# Patient Record
Sex: FEMALE | Race: BLACK OR AFRICAN AMERICAN | NOT HISPANIC OR LATINO | URBAN - METROPOLITAN AREA
[De-identification: names, ages, dates, MRNs, and addresses within clinical notes are randomized per-mention and may not be internally consistent; named-entity substitution may affect disease eponyms.]

---

## 2024-03-11 ENCOUNTER — OV EP (OUTPATIENT)
Dept: URBAN - METROPOLITAN AREA CLINIC 113 | Facility: CLINIC | Age: 29
End: 2024-03-11
Payer: MEDICAID

## 2024-03-11 VITALS
BODY MASS INDEX: 35.09 KG/M2 | TEMPERATURE: 97.5 F | HEART RATE: 98 BPM | WEIGHT: 156 LBS | SYSTOLIC BLOOD PRESSURE: 100 MMHG | HEIGHT: 56 IN | DIASTOLIC BLOOD PRESSURE: 73 MMHG | RESPIRATION RATE: 14 BRPM

## 2024-03-11 DIAGNOSIS — K59.04 CHRONIC IDIOPATHIC CONSTIPATION: ICD-10-CM

## 2024-03-11 DIAGNOSIS — R14.0 ABDOMINAL BLOATING: ICD-10-CM

## 2024-03-11 PROBLEM — 82934008: Status: ACTIVE | Noted: 2024-03-11

## 2024-03-11 PROCEDURE — 99214 OFFICE O/P EST MOD 30 MIN: CPT

## 2024-03-11 RX ORDER — AZITHROMYCIN DIHYDRATE 250 MG/1
TABLET, FILM COATED ORAL
Qty: 6 | Refills: 0 | Status: ON HOLD | COMMUNITY
Start: 2019-01-12

## 2024-03-11 RX ORDER — VENLAFAXINE 37.5 MG/1
TABLET, EXTENDED RELEASE ORAL
Qty: 30 | Refills: 0 | Status: ON HOLD | COMMUNITY
Start: 2018-12-11

## 2024-03-11 RX ORDER — LINACLOTIDE 72 UG/1
1 CAPSULE AT LEAST 30 MINUTES BEFORE THE FIRST MEAL OF THE DAY ON AN EMPTY STOMACH CAPSULE, GELATIN COATED ORAL ONCE A DAY
Qty: 90 | Refills: 3 | OUTPATIENT
Start: 2024-03-11 | End: 2025-03-05

## 2024-03-11 RX ORDER — INSULIN GLARGINE 300 U/ML
INJECT 32 UNIT DAILY INJECTION, SOLUTION SUBCUTANEOUS
Refills: 0 | Status: ACTIVE | COMMUNITY

## 2024-03-11 RX ORDER — EMPAGLIFLOZIN 25 MG/1
TABLET, FILM COATED ORAL
Qty: 30 | Refills: 0 | Status: ACTIVE | COMMUNITY
Start: 2018-09-10

## 2024-03-11 RX ORDER — EXENATIDE 2 MG/.65ML
INJECTION, SUSPENSION, EXTENDED RELEASE SUBCUTANEOUS
Qty: 4 | Refills: 0 | Status: ON HOLD | COMMUNITY
Start: 2018-05-11

## 2024-03-11 RX ORDER — OMEPRAZOLE 20 MG/1
TAKE 1 CAPSULE BY MOUTH DAILY CAPSULE, DELAYED RELEASE ORAL
Qty: 90 | Refills: 2 | Status: ON HOLD | COMMUNITY
Start: 2019-02-12

## 2024-03-11 RX ORDER — LINACLOTIDE 72 UG/1
TAKE 1 CAPSULE DAILY CAPSULE, GELATIN COATED ORAL
Qty: 90 | Refills: 3 | Status: ON HOLD | COMMUNITY
Start: 2019-02-12

## 2024-03-11 RX ORDER — BLOOD SUGAR DIAGNOSTIC
STRIP MISCELLANEOUS
Qty: 100 | Refills: 0 | Status: ON HOLD | COMMUNITY
Start: 2018-06-13

## 2024-03-11 RX ORDER — SITAGLIPTIN AND METFORMIN HYDROCHLORIDE 100; 1000 MG/1; MG/1
TABLET, FILM COATED, EXTENDED RELEASE ORAL
Qty: 30 | Refills: 0 | Status: ON HOLD | COMMUNITY
Start: 2018-03-12

## 2024-03-11 RX ORDER — BLOOD-GLUCOSE METER
EACH MISCELLANEOUS
Qty: 1 | Refills: 0 | Status: ON HOLD | COMMUNITY
Start: 2018-06-13

## 2024-03-11 RX ORDER — SITAGLIPTIN AND METFORMIN HYDROCHLORIDE 50; 1000 MG/1; MG/1
TAKE 1 TABLET DAILY WITH FOOD TABLET, FILM COATED ORAL
Refills: 0 | Status: ON HOLD | COMMUNITY

## 2024-03-11 RX ORDER — METFORMIN HYDROCHLORIDE 500 MG/1
TABLET, COATED ORAL
Qty: 30 | Refills: 0 | Status: ON HOLD | COMMUNITY
Start: 2018-06-20

## 2024-03-11 RX ORDER — ATORVASTATIN CALCIUM 80 MG
TAKE 1 TABLET DAILY TABLET ORAL
Refills: 0 | Status: ON HOLD | COMMUNITY

## 2024-03-11 RX ORDER — ROSUVASTATIN CALCIUM 40 MG/1
1 TABLET TABLET, COATED ORAL ONCE A DAY
Status: ACTIVE | COMMUNITY

## 2024-03-11 RX ORDER — INSULIN LISPRO 100 [IU]/ML
AS DIRECTED INJECTION, SOLUTION INTRAVENOUS; SUBCUTANEOUS
Status: ACTIVE | COMMUNITY

## 2024-03-11 RX ORDER — EXENATIDE 2 MG/.85ML
INJECT 32 UNITS DAILY INJECTION, SUSPENSION, EXTENDED RELEASE SUBCUTANEOUS
Refills: 0 | Status: ON HOLD | COMMUNITY
Start: 2018-12-10

## 2024-03-11 RX ORDER — ESCITALOPRAM 20 MG/1
TABLET, FILM COATED ORAL
Qty: 30 | Refills: 0 | Status: ON HOLD | COMMUNITY
Start: 2018-06-13

## 2024-03-11 RX ORDER — KETOCONAZOLE 20.5 MG/ML
SHAMPOO, SUSPENSION TOPICAL
Qty: 120 | Refills: 0 | Status: ON HOLD | COMMUNITY
Start: 2019-06-06

## 2024-03-11 RX ORDER — ALPRAZOLAM 0.25 MG/1
1 TABLET TABLET ORAL TWICE A DAY
Status: ACTIVE | COMMUNITY

## 2024-03-11 RX ORDER — TERBINAFINE HYDROCHLORIDE 250 MG/1
TK 1 T PO QD TABLET ORAL
Qty: 30 | Refills: 0 | Status: ON HOLD | COMMUNITY
Start: 2019-06-06

## 2024-03-11 NOTE — HPI-TODAY'S VISIT:
28-year-old female presents for follow-up.  She was last seen 10/5/2023.  We reviewed a low FODMAP diet and recommended align probiotic.  She would also increase her MiraLAX to daily use with addition of fiber to prevent loose stools.  If no improvement we would consider Flagyl.   She presents with her caregiver. Her mother had an appointment today and could not make it. She has yet to try the probiotic. She states Miralax daily does not help her consitpation. Her mother is incquiring about a prescription for constipation. She is also asking about "bitters" as a dietary aid for bloating.  10/5/2023:  28-year-old female with a history of Down syndrome, PDA repair as a child and diabetes mellitus presents for long interval follow-up.  She has been followed by Dr. Melvin since 2017 for constipation and heartburn.  She underwent EGD in 2018 for epigastric pain, heartburn and reflux symptoms.  This revealed normal esophagus, normal duodenum and gastritis.  Gastric biopsies revealed mild chronic gastritis, negative for H. pylori.  She was last seen in 2019 at which time she was provided samples of Linzess 72 mcg daily.  She has tried and failed MiraLAX in the past and Amitiza caused diarrhea.  She was to continue omeprazole 20 mg daily for reflux symptoms.  She has been lost to follow-up ever since. She was seen in the ED at [facility] on 7/28/2023 with complaints of abdominal pain and nausea.  Labs revealed unremarkable CBC, glucose 248, normal LFTs, normal lipase, moderate amount of blood in the urine, elevated white blood cells and bacteria in the urine.  CT scan of abdomen/pelvis revealed hyperemic tubular structures with extensive adjacent laboratory fat stranding involving the right greater than left adnexa consistent with pelvic inflammatory disease/salpingitis and concern for developing tubo-ovarian abscesses.  There is mild secondary inflammation of the cecum.  Pelvic ultrasound revealed unremarkable was unremarkable.  There is no suspicion for foul play.  She was started on oral antibiotics and was to follow-up. CT scan of the abdomen/pelvis with contrast 9/5/2023:Multiseptated fluid collection in the right adnexa most concerning for tubo-ovarian abscess with significant adjacent laboratory changes.  Multiple prominent mesenteric lymph nodes in the pelvis likely reactive.  Liver, gallbladder, pancreas, spleen are normal.  GI tract is normal. Gallbladder ultrasound on 9/5/2023:No acute findings. Pelvic ultrasound 9/5/2023: Visualized portion of the right ovary appears normal however large ovarian abscess seen on CT is likely more superiorly in position beyond the visualization of ultrasound.  Chest x-ray 9/5/2023: Chronic changes without definitive acute finding.  She presents with her mother who aids in providing a history as the patient is mostly non-verbal. She was having abdominal bloating a few months ago prior to presenting to the ED in July. The bloating had improved since then. The mother is not sure if the antibiotics given at the time helped and or whether the MiraLAX has been helping. She was noted to have constipation. She is taking MiraLAX every other day as daily use causes loose stools. She does consume a lot of dairy most days as well. Denies rectal bleeding. Denies abdominal pain. Her mother does not believe she has complained of reflux lately.   Regarding her GYN findings on CT, she was evaluated by her gynecologist who did not believe the CT findings to be clinically significant or concerning.

## 2024-05-13 ENCOUNTER — DASHBOARD ENCOUNTERS (OUTPATIENT)
Age: 29
End: 2024-05-13

## 2024-05-13 ENCOUNTER — OFFICE VISIT (OUTPATIENT)
Dept: URBAN - METROPOLITAN AREA CLINIC 113 | Facility: CLINIC | Age: 29
End: 2024-05-13
Payer: MEDICAID

## 2024-05-13 VITALS
TEMPERATURE: 97.5 F | BODY MASS INDEX: 36.53 KG/M2 | WEIGHT: 162.4 LBS | HEIGHT: 56 IN | SYSTOLIC BLOOD PRESSURE: 95 MMHG | HEART RATE: 97.2 BPM | DIASTOLIC BLOOD PRESSURE: 64 MMHG

## 2024-05-13 DIAGNOSIS — K59.04 CHRONIC IDIOPATHIC CONSTIPATION: ICD-10-CM

## 2024-05-13 DIAGNOSIS — R14.0 ABDOMINAL BLOATING: ICD-10-CM

## 2024-05-13 PROCEDURE — 99214 OFFICE O/P EST MOD 30 MIN: CPT

## 2024-05-13 RX ORDER — TERBINAFINE HYDROCHLORIDE 250 MG/1
TK 1 T PO QD TABLET ORAL
Qty: 30 | Refills: 0 | Status: ON HOLD | COMMUNITY
Start: 2019-06-06

## 2024-05-13 RX ORDER — INSULIN GLARGINE 300 U/ML
INJECT 32 UNIT DAILY INJECTION, SOLUTION SUBCUTANEOUS
Refills: 0 | Status: ACTIVE | COMMUNITY

## 2024-05-13 RX ORDER — AZITHROMYCIN DIHYDRATE 250 MG/1
TABLET, FILM COATED ORAL
Qty: 6 | Refills: 0 | Status: ON HOLD | COMMUNITY
Start: 2019-01-12

## 2024-05-13 RX ORDER — LINACLOTIDE 72 UG/1
1 CAPSULE AT LEAST 30 MINUTES BEFORE THE FIRST MEAL OF THE DAY ON AN EMPTY STOMACH CAPSULE, GELATIN COATED ORAL ONCE A DAY
Qty: 90 | Refills: 3 | Status: ACTIVE | COMMUNITY
Start: 2024-03-11 | End: 2025-03-05

## 2024-05-13 RX ORDER — METFORMIN HYDROCHLORIDE 500 MG/1
TABLET, COATED ORAL
Qty: 30 | Refills: 0 | Status: ON HOLD | COMMUNITY
Start: 2018-06-20

## 2024-05-13 RX ORDER — VENLAFAXINE 37.5 MG/1
TABLET, EXTENDED RELEASE ORAL
Qty: 30 | Refills: 0 | Status: ON HOLD | COMMUNITY
Start: 2018-12-11

## 2024-05-13 RX ORDER — BLOOD-GLUCOSE METER
EACH MISCELLANEOUS
Qty: 1 | Refills: 0 | Status: ON HOLD | COMMUNITY
Start: 2018-06-13

## 2024-05-13 RX ORDER — OMEPRAZOLE 20 MG/1
TAKE 1 CAPSULE BY MOUTH DAILY CAPSULE, DELAYED RELEASE ORAL
Qty: 90 | Refills: 2 | Status: ON HOLD | COMMUNITY
Start: 2019-02-12

## 2024-05-13 RX ORDER — SITAGLIPTIN AND METFORMIN HYDROCHLORIDE 50; 1000 MG/1; MG/1
TAKE 1 TABLET DAILY WITH FOOD TABLET, FILM COATED ORAL
Refills: 0 | Status: ON HOLD | COMMUNITY

## 2024-05-13 RX ORDER — FLUCONAZOLE 150 MG/1
1 TABLET TABLET ORAL
Qty: 2 | Refills: 0 | OUTPATIENT
Start: 2024-05-13 | End: 2024-05-15

## 2024-05-13 RX ORDER — LINACLOTIDE 72 UG/1
1 CAPSULE AT LEAST 30 MINUTES BEFORE THE FIRST MEAL OF THE DAY ON AN EMPTY STOMACH CAPSULE, GELATIN COATED ORAL ONCE A DAY
Qty: 90 | Refills: 3 | OUTPATIENT

## 2024-05-13 RX ORDER — METRONIDAZOLE 250 MG/1
1 TABLET TABLET ORAL THREE TIMES A DAY
Qty: 30 TABLET | Refills: 0 | OUTPATIENT
Start: 2024-05-13 | End: 2024-05-23

## 2024-05-13 RX ORDER — ATORVASTATIN CALCIUM 80 MG
TAKE 1 TABLET DAILY TABLET ORAL
Refills: 0 | Status: ON HOLD | COMMUNITY

## 2024-05-13 RX ORDER — SITAGLIPTIN AND METFORMIN HYDROCHLORIDE 100; 1000 MG/1; MG/1
TABLET, FILM COATED, EXTENDED RELEASE ORAL
Qty: 30 | Refills: 0 | Status: ON HOLD | COMMUNITY
Start: 2018-03-12

## 2024-05-13 RX ORDER — INSULIN LISPRO 100 [IU]/ML
AS DIRECTED INJECTION, SOLUTION INTRAVENOUS; SUBCUTANEOUS
Status: ACTIVE | COMMUNITY

## 2024-05-13 RX ORDER — BLOOD SUGAR DIAGNOSTIC
STRIP MISCELLANEOUS
Qty: 100 | Refills: 0 | Status: ON HOLD | COMMUNITY
Start: 2018-06-13

## 2024-05-13 RX ORDER — EXENATIDE 2 MG/.65ML
INJECTION, SUSPENSION, EXTENDED RELEASE SUBCUTANEOUS
Qty: 4 | Refills: 0 | Status: ON HOLD | COMMUNITY
Start: 2018-05-11

## 2024-05-13 RX ORDER — ALPRAZOLAM 0.25 MG/1
1 TABLET TABLET ORAL TWICE A DAY
Status: ACTIVE | COMMUNITY

## 2024-05-13 RX ORDER — ESCITALOPRAM 20 MG/1
TABLET, FILM COATED ORAL
Qty: 30 | Refills: 0 | Status: ON HOLD | COMMUNITY
Start: 2018-06-13

## 2024-05-13 RX ORDER — EMPAGLIFLOZIN 25 MG/1
TABLET, FILM COATED ORAL
Qty: 30 | Refills: 0 | Status: ON HOLD | COMMUNITY
Start: 2018-09-10

## 2024-05-13 RX ORDER — LINACLOTIDE 72 UG/1
TAKE 1 CAPSULE DAILY CAPSULE, GELATIN COATED ORAL
Qty: 90 | Refills: 3 | Status: ON HOLD | COMMUNITY
Start: 2019-02-12

## 2024-05-13 RX ORDER — EXENATIDE 2 MG/.85ML
INJECT 32 UNITS DAILY INJECTION, SUSPENSION, EXTENDED RELEASE SUBCUTANEOUS
Refills: 0 | Status: ON HOLD | COMMUNITY
Start: 2018-12-10

## 2024-05-13 RX ORDER — ROSUVASTATIN CALCIUM 40 MG/1
1 TABLET TABLET, COATED ORAL ONCE A DAY
Status: ACTIVE | COMMUNITY

## 2024-05-13 RX ORDER — KETOCONAZOLE 20.5 MG/ML
SHAMPOO, SUSPENSION TOPICAL
Qty: 120 | Refills: 0 | Status: ON HOLD | COMMUNITY
Start: 2019-06-06

## 2024-08-13 ENCOUNTER — OFFICE VISIT (OUTPATIENT)
Dept: URBAN - METROPOLITAN AREA CLINIC 113 | Facility: CLINIC | Age: 29
End: 2024-08-13

## 2024-08-13 RX ORDER — VENLAFAXINE 37.5 MG/1
TABLET, EXTENDED RELEASE ORAL
Qty: 30 | Refills: 0 | Status: ON HOLD | COMMUNITY
Start: 2018-12-11

## 2024-08-13 RX ORDER — SITAGLIPTIN AND METFORMIN HYDROCHLORIDE 100; 1000 MG/1; MG/1
TABLET, FILM COATED, EXTENDED RELEASE ORAL
Qty: 30 | Refills: 0 | Status: ON HOLD | COMMUNITY
Start: 2018-03-12

## 2024-08-13 RX ORDER — EXENATIDE 2 MG/.85ML
INJECT 32 UNITS DAILY INJECTION, SUSPENSION, EXTENDED RELEASE SUBCUTANEOUS
Refills: 0 | Status: ON HOLD | COMMUNITY
Start: 2018-12-10

## 2024-08-13 RX ORDER — METFORMIN HYDROCHLORIDE 500 MG/1
TABLET, COATED ORAL
Qty: 30 | Refills: 0 | Status: ON HOLD | COMMUNITY
Start: 2018-06-20

## 2024-08-13 RX ORDER — ATORVASTATIN CALCIUM 80 MG
TAKE 1 TABLET DAILY TABLET ORAL
Refills: 0 | Status: ON HOLD | COMMUNITY

## 2024-08-13 RX ORDER — LINACLOTIDE 72 UG/1
1 CAPSULE AT LEAST 30 MINUTES BEFORE THE FIRST MEAL OF THE DAY ON AN EMPTY STOMACH CAPSULE, GELATIN COATED ORAL ONCE A DAY
Qty: 90 | Refills: 3 | Status: ACTIVE | COMMUNITY

## 2024-08-13 RX ORDER — EMPAGLIFLOZIN 25 MG/1
TABLET, FILM COATED ORAL
Qty: 30 | Refills: 0 | Status: ON HOLD | COMMUNITY
Start: 2018-09-10

## 2024-08-13 RX ORDER — ESCITALOPRAM 20 MG/1
TABLET, FILM COATED ORAL
Qty: 30 | Refills: 0 | Status: ON HOLD | COMMUNITY
Start: 2018-06-13

## 2024-08-13 RX ORDER — ROSUVASTATIN CALCIUM 40 MG/1
1 TABLET TABLET, COATED ORAL ONCE A DAY
Status: ACTIVE | COMMUNITY

## 2024-08-13 RX ORDER — TERBINAFINE HYDROCHLORIDE 250 MG/1
TK 1 T PO QD TABLET ORAL
Qty: 30 | Refills: 0 | Status: ON HOLD | COMMUNITY
Start: 2019-06-06

## 2024-08-13 RX ORDER — INSULIN LISPRO 100 [IU]/ML
AS DIRECTED INJECTION, SOLUTION INTRAVENOUS; SUBCUTANEOUS
Status: ACTIVE | COMMUNITY

## 2024-08-13 RX ORDER — INSULIN GLARGINE 300 U/ML
INJECT 32 UNIT DAILY INJECTION, SOLUTION SUBCUTANEOUS
Refills: 0 | Status: ACTIVE | COMMUNITY

## 2024-08-13 RX ORDER — LINACLOTIDE 72 UG/1
TAKE 1 CAPSULE DAILY CAPSULE, GELATIN COATED ORAL
Qty: 90 | Refills: 3 | Status: ON HOLD | COMMUNITY
Start: 2019-02-12

## 2024-08-13 RX ORDER — OMEPRAZOLE 20 MG/1
TAKE 1 CAPSULE BY MOUTH DAILY CAPSULE, DELAYED RELEASE ORAL
Qty: 90 | Refills: 2 | Status: ON HOLD | COMMUNITY
Start: 2019-02-12

## 2024-08-13 RX ORDER — KETOCONAZOLE 20.5 MG/ML
SHAMPOO, SUSPENSION TOPICAL
Qty: 120 | Refills: 0 | Status: ON HOLD | COMMUNITY
Start: 2019-06-06

## 2024-08-13 RX ORDER — ALPRAZOLAM 0.25 MG/1
1 TABLET TABLET ORAL TWICE A DAY
Status: ACTIVE | COMMUNITY

## 2024-08-13 RX ORDER — BLOOD SUGAR DIAGNOSTIC
STRIP MISCELLANEOUS
Qty: 100 | Refills: 0 | Status: ON HOLD | COMMUNITY
Start: 2018-06-13

## 2024-08-13 RX ORDER — BLOOD-GLUCOSE METER
EACH MISCELLANEOUS
Qty: 1 | Refills: 0 | Status: ON HOLD | COMMUNITY
Start: 2018-06-13

## 2024-08-13 RX ORDER — EXENATIDE 2 MG/.65ML
INJECTION, SUSPENSION, EXTENDED RELEASE SUBCUTANEOUS
Qty: 4 | Refills: 0 | Status: ON HOLD | COMMUNITY
Start: 2018-05-11

## 2024-08-13 RX ORDER — AZITHROMYCIN DIHYDRATE 250 MG/1
TABLET, FILM COATED ORAL
Qty: 6 | Refills: 0 | Status: ON HOLD | COMMUNITY
Start: 2019-01-12

## 2024-08-13 RX ORDER — SITAGLIPTIN AND METFORMIN HYDROCHLORIDE 50; 1000 MG/1; MG/1
TAKE 1 TABLET DAILY WITH FOOD TABLET, FILM COATED ORAL
Refills: 0 | Status: ON HOLD | COMMUNITY

## 2024-08-13 NOTE — HPI-TODAY'S VISIT:
28-year-old female presents for follow-up.  She was last seen May 2024.  She was tried on another course of Flagyl for her bloating.  She was provided Diflucan to prevent yeast infection.  She was to continue Linzess for her constipation. 5/2024: 28-year-old female presents for follow-up.  She was last seen on 3/11/2024.  We again reviewed a low FODMAP diet and recommended align probiotic.  MiraLAX was ineffective for her constipation.  She was started on Linzess 72 mcg daily and samples were provided.  If bloating persisted despite improvement in bowels we would consider additional course of Flagyl.  She presents with her mother. Her mother states her constipation has markedly improved on Linzes 72 mcg. She takes about twice per week to move her bowels daily. Her bloating has improved but not resolved. She did start daily align as well.   3/11/2024: 28-year-old female presents for follow-up.  She was last seen 10/5/2023.  We reviewed a low FODMAP diet and recommended align probiotic.  She would also increase her MiraLAX to daily use with addition of fiber to prevent loose stools.  If no improvement we would consider Flagyl.   She presents with her caregiver. Her mother had an appointment today and could not make it. She has yet to try the probiotic. She states Miralax daily does not help her consitpation. Her mother is incquiring about a prescription for constipation. She is also asking about "bitters" as a dietary aid for bloating.  10/5/2023:  28-year-old female with a history of Down syndrome, PDA repair as a child and diabetes mellitus presents for long interval follow-up.  She has been followed by Dr. Melvin since 2017 for constipation and heartburn.  She underwent EGD in 2018 for epigastric pain, heartburn and reflux symptoms.  This revealed normal esophagus, normal duodenum and gastritis.  Gastric biopsies revealed mild chronic gastritis, negative for H. pylori.  She was last seen in 2019 at which time she was provided samples of Linzess 72 mcg daily.  She has tried and failed MiraLAX in the past and Amitiza caused diarrhea.  She was to continue omeprazole 20 mg daily for reflux symptoms.  She has been lost to follow-up ever since. She was seen in the ED at [facility] on 7/28/2023 with complaints of abdominal pain and nausea.  Labs revealed unremarkable CBC, glucose 248, normal LFTs, normal lipase, moderate amount of blood in the urine, elevated white blood cells and bacteria in the urine.  CT scan of abdomen/pelvis revealed hyperemic tubular structures with extensive adjacent laboratory fat stranding involving the right greater than left adnexa consistent with pelvic inflammatory disease/salpingitis and concern for developing tubo-ovarian abscesses.  There is mild secondary inflammation of the cecum.  Pelvic ultrasound revealed unremarkable was unremarkable.  There is no suspicion for foul play.  She was started on oral antibiotics and was to follow-up. CT scan of the abdomen/pelvis with contrast 9/5/2023:Multiseptated fluid collection in the right adnexa most concerning for tubo-ovarian abscess with significant adjacent laboratory changes.  Multiple prominent mesenteric lymph nodes in the pelvis likely reactive.  Liver, gallbladder, pancreas, spleen are normal.  GI tract is normal. Gallbladder ultrasound on 9/5/2023:No acute findings. Pelvic ultrasound 9/5/2023: Visualized portion of the right ovary appears normal however large ovarian abscess seen on CT is likely more superiorly in position beyond the visualization of ultrasound.  Chest x-ray 9/5/2023: Chronic changes without definitive acute finding.  She presents with her mother who aids in providing a history as the patient is mostly non-verbal. She was having abdominal bloating a few months ago prior to presenting to the ED in July. The bloating had improved since then. The mother is not sure if the antibiotics given at the time helped and or whether the MiraLAX has been helping. She was noted to have constipation. She is taking MiraLAX every other day as daily use causes loose stools. She does consume a lot of dairy most days as well. Denies rectal bleeding. Denies abdominal pain. Her mother does not believe she has complained of reflux lately.   Regarding her GYN findings on CT, she was evaluated by her gynecologist who did not believe the CT findings to be clinically significant or concerning.